# Patient Record
Sex: MALE | Race: WHITE | Employment: UNEMPLOYED | URBAN - METROPOLITAN AREA
[De-identification: names, ages, dates, MRNs, and addresses within clinical notes are randomized per-mention and may not be internally consistent; named-entity substitution may affect disease eponyms.]

---

## 2021-09-27 ENCOUNTER — APPOINTMENT (OUTPATIENT)
Dept: GENERAL RADIOLOGY | Age: 36
End: 2021-09-27
Payer: COMMERCIAL

## 2021-09-27 ENCOUNTER — APPOINTMENT (OUTPATIENT)
Dept: CT IMAGING | Age: 36
End: 2021-09-27
Payer: COMMERCIAL

## 2021-09-27 ENCOUNTER — HOSPITAL ENCOUNTER (EMERGENCY)
Age: 36
Discharge: HOME | End: 2021-09-27
Attending: EMERGENCY MEDICINE
Payer: COMMERCIAL

## 2021-09-27 VITALS
DIASTOLIC BLOOD PRESSURE: 86 MMHG | OXYGEN SATURATION: 97 % | SYSTOLIC BLOOD PRESSURE: 139 MMHG | RESPIRATION RATE: 16 BRPM | TEMPERATURE: 98.9 F | HEART RATE: 79 BPM

## 2021-09-27 DIAGNOSIS — T50.905A ADVERSE EFFECT OF DRUG, INITIAL ENCOUNTER: Primary | ICD-10-CM

## 2021-09-27 LAB
ACETAMINOPHEN LEVEL: <5 MCG/ML (ref 10–30)
ALBUMIN SERPL-MCNC: 4 G/DL (ref 3.5–5.2)
ALP BLD-CCNC: 47 U/L (ref 40–129)
ALT SERPL-CCNC: 18 U/L (ref 0–40)
AMPHETAMINE SCREEN, URINE: POSITIVE
ANION GAP SERPL CALCULATED.3IONS-SCNC: 9 MMOL/L (ref 7–16)
AST SERPL-CCNC: 22 U/L (ref 0–39)
BARBITURATE SCREEN URINE: NOT DETECTED
BASOPHILS ABSOLUTE: 0 E9/L (ref 0–0.2)
BASOPHILS RELATIVE PERCENT: 0.4 % (ref 0–2)
BENZODIAZEPINE SCREEN, URINE: NOT DETECTED
BILIRUB SERPL-MCNC: 1.1 MG/DL (ref 0–1.2)
BUN BLDV-MCNC: 12 MG/DL (ref 6–20)
BURR CELLS: ABNORMAL
CALCIUM SERPL-MCNC: 9.2 MG/DL (ref 8.6–10.2)
CANNABINOID SCREEN URINE: NOT DETECTED
CHLORIDE BLD-SCNC: 104 MMOL/L (ref 98–107)
CO2: 24 MMOL/L (ref 22–29)
COCAINE METABOLITE SCREEN URINE: NOT DETECTED
CREAT SERPL-MCNC: 1 MG/DL (ref 0.7–1.2)
EOSINOPHILS ABSOLUTE: 0 E9/L (ref 0.05–0.5)
EOSINOPHILS RELATIVE PERCENT: 0.5 % (ref 0–6)
ETHANOL: <10 MG/DL (ref 0–0.08)
FENTANYL SCREEN, URINE: NOT DETECTED
GFR AFRICAN AMERICAN: >60
GFR NON-AFRICAN AMERICAN: >60 ML/MIN/1.73
GLUCOSE BLD-MCNC: 100 MG/DL (ref 74–99)
HCT VFR BLD CALC: 41.9 % (ref 37–54)
HEMOGLOBIN: 14.2 G/DL (ref 12.5–16.5)
LYMPHOCYTES ABSOLUTE: 1.37 E9/L (ref 1.5–4)
LYMPHOCYTES RELATIVE PERCENT: 7.8 % (ref 20–42)
Lab: ABNORMAL
MCH RBC QN AUTO: 29.4 PG (ref 26–35)
MCHC RBC AUTO-ENTMCNC: 33.9 % (ref 32–34.5)
MCV RBC AUTO: 86.7 FL (ref 80–99.9)
METHADONE SCREEN, URINE: NOT DETECTED
MONOCYTES ABSOLUTE: 0.51 E9/L (ref 0.1–0.95)
MONOCYTES RELATIVE PERCENT: 2.6 % (ref 2–12)
NEUTROPHILS ABSOLUTE: 15.39 E9/L (ref 1.8–7.3)
NEUTROPHILS RELATIVE PERCENT: 89.6 % (ref 43–80)
OPIATE SCREEN URINE: NOT DETECTED
OVALOCYTES: ABNORMAL
OXYCODONE URINE: NOT DETECTED
PDW BLD-RTO: 12.4 FL (ref 11.5–15)
PHENCYCLIDINE SCREEN URINE: NOT DETECTED
PLATELET # BLD: 260 E9/L (ref 130–450)
PMV BLD AUTO: 9.1 FL (ref 7–12)
POIKILOCYTES: ABNORMAL
POLYCHROMASIA: ABNORMAL
POTASSIUM SERPL-SCNC: 3.7 MMOL/L (ref 3.5–5)
RBC # BLD: 4.83 E12/L (ref 3.8–5.8)
SALICYLATE, SERUM: <0.3 MG/DL (ref 0–30)
SODIUM BLD-SCNC: 137 MMOL/L (ref 132–146)
TARGET CELLS: ABNORMAL
TOTAL PROTEIN: 6.6 G/DL (ref 6.4–8.3)
TRICYCLIC ANTIDEPRESSANTS SCREEN SERUM: NEGATIVE NG/ML
WBC # BLD: 17.1 E9/L (ref 4.5–11.5)

## 2021-09-27 PROCEDURE — 80143 DRUG ASSAY ACETAMINOPHEN: CPT

## 2021-09-27 PROCEDURE — 82077 ASSAY SPEC XCP UR&BREATH IA: CPT

## 2021-09-27 PROCEDURE — 72125 CT NECK SPINE W/O DYE: CPT

## 2021-09-27 PROCEDURE — 80307 DRUG TEST PRSMV CHEM ANLYZR: CPT

## 2021-09-27 PROCEDURE — 70450 CT HEAD/BRAIN W/O DYE: CPT

## 2021-09-27 PROCEDURE — 36415 COLL VENOUS BLD VENIPUNCTURE: CPT

## 2021-09-27 PROCEDURE — 73030 X-RAY EXAM OF SHOULDER: CPT

## 2021-09-27 PROCEDURE — 99284 EMERGENCY DEPT VISIT MOD MDM: CPT

## 2021-09-27 PROCEDURE — 85025 COMPLETE CBC W/AUTO DIFF WBC: CPT

## 2021-09-27 PROCEDURE — 80179 DRUG ASSAY SALICYLATE: CPT

## 2021-09-27 PROCEDURE — 80053 COMPREHEN METABOLIC PANEL: CPT

## 2021-09-27 ASSESSMENT — ENCOUNTER SYMPTOMS
ABDOMINAL PAIN: 0
WHEEZING: 0
EYE REDNESS: 0
SINUS PRESSURE: 0
VOMITING: 0
COUGH: 0
EYE PAIN: 0
EYE DISCHARGE: 0
DIARRHEA: 0
SORE THROAT: 0
BACK PAIN: 0
NAUSEA: 0
SHORTNESS OF BREATH: 0

## 2021-09-27 NOTE — ED TRIAGE NOTES
Pt cooperative with care on arrival. Placed on monitor in jenkins bed. Pt resting with eyes closed at this time.

## 2021-09-27 NOTE — ED NOTES
Bed: H2  Expected date:   Expected time:   Means of arrival:   Comments:  tatiana Singh RN  09/27/21 0010

## 2021-09-27 NOTE — ED NOTES
Provided pt with water at this time. Pt reports he is unable to void.      Gunjan Donohue RN  09/27/21 [id number]
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
